# Patient Record
Sex: MALE | Race: WHITE | NOT HISPANIC OR LATINO | Employment: UNEMPLOYED | URBAN - METROPOLITAN AREA
[De-identification: names, ages, dates, MRNs, and addresses within clinical notes are randomized per-mention and may not be internally consistent; named-entity substitution may affect disease eponyms.]

---

## 2017-10-26 ENCOUNTER — ALLSCRIPTS OFFICE VISIT (OUTPATIENT)
Dept: OTHER | Facility: OTHER | Age: 72
End: 2017-10-26

## 2017-10-26 LAB
BILIRUB UR QL STRIP: NORMAL
CLARITY UR: NORMAL
COLOR UR: YELLOW
GLUCOSE (HISTORICAL): NORMAL
HGB UR QL STRIP.AUTO: NORMAL
KETONES UR STRIP-MCNC: NORMAL MG/DL
LEUKOCYTE ESTERASE UR QL STRIP: NORMAL
NITRITE UR QL STRIP: NORMAL
PH UR STRIP.AUTO: 5.5 [PH]
PROT UR STRIP-MCNC: NORMAL MG/DL
SP GR UR STRIP.AUTO: 1.02
UROBILINOGEN UR QL STRIP.AUTO: 0.2

## 2017-11-20 DIAGNOSIS — R97.20 ELEVATED PROSTATE SPECIFIC ANTIGEN (PSA): ICD-10-CM

## 2017-11-20 PROCEDURE — 88344 IMHCHEM/IMCYTCHM EA MLT ANTB: CPT | Performed by: UROLOGY

## 2017-11-20 PROCEDURE — G0416 PROSTATE BIOPSY, ANY MTHD: HCPCS | Performed by: UROLOGY

## 2017-11-21 ENCOUNTER — LAB REQUISITION (OUTPATIENT)
Dept: LAB | Facility: HOSPITAL | Age: 72
End: 2017-11-21
Payer: MEDICARE

## 2017-11-21 DIAGNOSIS — R97.20 ELEVATED PROSTATE SPECIFIC ANTIGEN (PSA): ICD-10-CM

## 2017-11-24 ENCOUNTER — GENERIC CONVERSION - ENCOUNTER (OUTPATIENT)
Dept: OTHER | Facility: OTHER | Age: 72
End: 2017-11-24

## 2017-12-08 ENCOUNTER — GENERIC CONVERSION - ENCOUNTER (OUTPATIENT)
Dept: OTHER | Facility: OTHER | Age: 72
End: 2017-12-08

## 2017-12-19 ENCOUNTER — GENERIC CONVERSION - ENCOUNTER (OUTPATIENT)
Dept: OTHER | Facility: OTHER | Age: 72
End: 2017-12-19

## 2017-12-19 ENCOUNTER — ALLSCRIPTS OFFICE VISIT (OUTPATIENT)
Dept: OTHER | Facility: OTHER | Age: 72
End: 2017-12-19

## 2017-12-20 NOTE — CONSULTS
Assessment  1  History of Cholecystectomy   2  Family history of cardiac disorder (V17 49) (Z82 49) : Father   3  Carcinoma of prostate (185) (C61)    Discussion/Summary  Discussion Summary: This gentleman has a stage II, low but not insignificant volume, intermediate risk prostate cancer  In general terms, his risk level is on the lower end of intermediate  However, the PSA doubling time is a little more than 12 months, which makes this one less optimal for Active Surveillance  He is just 66 with a reasonable longevity expectation  So my recommendation is that this cancer be treated and not observed  He is beyond the usual age for surgery  He was not interested in brachytherapy, or one of the other alternatives  Radiotherapy is an excellent option, and the one I recommend for intermediate risk cancers  He does not need the addition of hormone therapy  The patient is in agreement with this approach  So in consideration of his age, disease and health, I will treat him with IMRT/IGRT covering the prostate gland to a dose of 81 Gy  We will make arrangements for him to receive the gold fiducial marker insertion and schedule a Simulation/RT planning session  We will be able to get him started in January after he returns from his brief trip  Goals and Barriers: The patient has the current Goals: Comply with treatment plan  None  Patient's Capacity to Self-Care: Patient is able to Self-Care  Patient Education: Educational resources provided: Drawings and lengthy instruction about the RT process  Chief Complaint  Chief Complaint Free Text Note Form: Radiation Oncology Consult for prostate cancer  History of Present Illness  HPI: Gaviota Joy is a 66year old male with a new diagnosis of prostate cancer  He has been followed after treatment for a noninvasive bladder cancer in December 2012  He has had a mildly elevated PSA in the 4-5 range over the intervening years, which has been under observation   His PSA then jack to 8 0 in April of this year, and then 10 8 in September  His RANI has been negative for nodule  A biopsy was performed 11/20/17, revealing low grade prostate cancer, Doyle 6, in 5 of the 12 cores, with involvement ranging from 10% to 60% of those cores  There were quite a few cores with HGPIN, two with perineural invasion, and two additional cores with atypia  No further work up was required  He has discussed his options with Dr Damian Obrien, and has come for a radiation oncology consultation  Interval History: AUA 3, Recent event: August 2017 pancreatitisProstate volume 51 gm  Review of Systems  Complete Male ROS [facility] Rad Onc:  Constitutional: The patient denies new or recent history of general fatigue, no recent weight loss, no change in appetite  Karnofsky Performance Scale 90% - Normal, only minor signs/symptoms  Eyes: wears corrective lenses  ENT: sore throat-- and-- hearing loss  Cardiovascular: No complaints of chest pain, no palpitations, no ankle edema  Respiratory: No complaints of shortness of breath, no cough  Gastrointestinal: No complaints of jaundice, no bloody stools, no pale stools  Genitourinary: as noted in HPI  Musculoskeletal: numbness in toes do to back injury  Integumentary: No complaints of rash, no new lesions  Neurological: numbness-- and-- [place] and numbness in toes due to back injury  Hematologic/Lymphatic: No complaints of easy bruising  ROS Reviewed:   ROS reviewed  Active Problems  1  Elevated prostate specific antigen (PSA) (790 93) (R97 20)    Past Medical History  1  History of Acute cystitis without hematuria (595 0) (N30 00)   2  History of Benign localized hyperplasia of prostate with urinary obstruction and lower urinary tract symptoms (600 91,599 69) (N40 1,N13 9)   3  History of Bladder trabeculation (596 89) (N32 89)   4  History of gastroesophageal reflux (GERD) (V12 79) (Z87 19)   5  History of gross hematuria (V13 09) (Z87 448)   6  History of malignant neoplasm of bladder (V10 51) (Z85 51)   7  History of urinary retention (V13 09) (Z87 898)   8  History of Microhematuria (599 72) (R31 29)  Prior Radiation or Chemo:  Prior Radiation: none  Prior Chemotherapy: none   Past Medical History Reviewed: The past medical history was reviewed and updated today  Surgical History  1  History of Back Surgery   2  History of Cholecystectomy   3  History of Cystoscopy (Diagnostic) Bladder   4  History of Cystoscopy With Resection Of Tumor   5  History of Inguinal Hernia Repair   6  History of Knee Surgery   7  History of Laparoscopy Orchiectomy   8  History of Tonsillectomy With Adenoidectomy  Surgical History Reviewed: The surgical history was reviewed and updated today  Family History  1  Family history of lymphoma (V16 7) (Z80 2)   2  Family history of malignant neoplasm (V16 9) (Z80 9)  3  Family history of cardiac disorder (V17 49) (Z82 49)  Family History Reviewed: The family history was reviewed and updated today  Social History   · Does not drink alcohol (V49 89) (Z78 9)   · Does not use smokeless tobacco (V49 89) (Z78 9)   · Former smoker (G05 17) (M20 536)   ·    · No caffeine use   · No illicit drug use  Social History Reviewed: The social history was reviewed and updated today  The social history was reviewed and is unchanged  Current Meds   1  Calcium CAPS; Therapy: (Recorded:26Oct2017) to Recorded   2  Echinacea CAPS; Therapy: (Recorded:26Oct2017) to Recorded   3  Fluticasone Propionate 50 MCG/ACT Nasal Suspension; Therapy: (Recorded:74Gfs3351) to Recorded   4  Multi Vitamin Daily TABS; Therapy: (Recorded:26Oct2017) to Recorded   5  Naproxen 500 MG Oral Tablet; Therapy: (Recorded:26Oct2017) to Recorded   6  Prevacid 30 MG Oral Capsule Delayed Release; Therapy: (Recorded:26Oct2017) to Recorded   7  Vitamin C CAPS; Therapy: (Recorded:26Oct2017) to Recorded    Allergies  1   No Known Drug Allergies    Vitals  Vital Signs    Recorded: 64TVV0536 09:00AM   Heart Rate 80   Systolic 590   Diastolic 88   Height 5 ft 9 in   Weight 220 lb    BMI Calculated 32 49   BSA Calculated 2 15     Physical Exam   Constitutional: General appearance: The Patient is well-developed, well-nourished male who appears his stated age in no acute distress  He is pleasant and talkative  HEENT: Head and face: Normal  -- There is no oral pathology noted  -- The thyroid is normal to inspection and palpation  -- There is no appreciable cervical adenopathy   -- Neck is supple without adenopathy  Chest: The chest is normal to inspection  -- The lungs are clear to auscultation  -- There are bilaterally symmetrical breath sounds  -- There is a RRR, normal S1 and S2, without murmurs, rub or gallop  Genitourinary  Anus and perineum: Normal    Digital rectal exam of prostate: Normal size, no masses  -- mild enlarged  Abdomen: mild obesity  -- There is no evidence of hepatosplenomegaly  Extremities: Gait and station: Normal  -- There is no edema  Neuro: Grossly nonfocal  -- Orientation to person, place and time: Normal  -- Mood and affect: Normal    Lymphatic: no evidence of cervical adenopathy bilaterally  -- no evidence of axillary adenopathy bilaterally  -- no evidence of inguinal adenopathy bilaterally  Results/Data  Diagnostic Studies Reviewed [address] [place] [address] 14 Rad Onc:  CT Scan Review CT scan of Abd and pelvis : 11/27/12 no evidence of calculus  Lab Review 12/23/13 FISH there was no abnormal cells with gains of 2 or more chromosomes identified9/23/15 PSA 5 623/16/16 PSA 4 74/25/17 8 09/15/17 10 8  Pathology Review urine cytology 10/20/16 negative     Op Notes: 12/03/12 TURBT (1) TISSUE EXAM [id number] 01:00PM Majo Hewitt     Test Name Result Flag Reference   LAB AP CASE REPORT (Report)     Surgical Pathology Report             Case: [id number]                  Authorizing Provider: Sarahy Nuñez MD      Collected:      11/20/2017 [address] [place] [address]       Pathologist:      Guero Mendoza MD      Received:      11/21/2017 2942       Specimens:  A) - Prostate, Right lateral base                                  B) - Prostate, Right lateral middle                                 C) - Prostate, Right lateral apex                                  D) - Prostate, Left lateral base                                   E) - Prostate, Left lateral middle                                  F) - Prostate, Left lateral apex                                   G) - Prostate, Left base                                       H) - Prostate, Left middle                                      I) - Prostate, Left apex                                       J) - Prostate, Right base                                      K) - Prostate, Right middle                                     L) - Prostate, Right apex   LAB AP FINAL DIAGNOSIS (Report)     A  Prostate, Right lateral base, needle core biopsy: - Benign prostate tissue; negative for malignancy and high grade prostatic  intraepithelial neoplasia  - Focal acute and chronic inflammation  B  Prostate, Right lateral middle, needle core biopsy: - Prostatic adenocarcinoma, acinar, not otherwise specified; at least  [place]   grade 3+3= score of 6, Prostatic Grade Group I, discontinuously  involving 60% of the   one submitted core  -- Confirmed by prostate triple immunostain (p63 and HMWCK negative;  P504S positive)  - Periprostatic fat invasion: Not identified  - Seminal vesicle invasion: Not identified  - Lymph-vascular invasion: Not identified  - Perineural invasion: Not identified   - Additional Pathologic Findings: None  C  Prostate, Right lateral apex, needle core biopsy: - Prostatic adenocarcinoma, acinar, not otherwise specified; at least  Doyle   grade 3+3= score of 6, Prostatic Grade Group I, involving 15% of the one  submitted core  -- Confirmed by prostate triple immunostain (p63 and HMWCK negative;  P504S positive)    - Periprostatic fat invasion: Not identified  - Seminal vesicle invasion: Not identified  - Lymph-vascular invasion: Not identified  - Perineural invasion: Not identified   - Additional Pathologic Findings: High-grade prostatic intraepithelial  neoplasia (HGPIN)  D  Prostate, Left lateral base, needle core biopsy: - Benign prostate tissue; negative for malignancy and high grade prostatic  intraepithelial neoplasia  E  Prostate, Left lateral middle, needle core biopsy: - Prostatic adenocarcinoma, acinar, not otherwise specified; Doyle grade  3+3= score of 6, Prostatic   Grade Group I, discontinuously involving 25% of the one submitted core  - Periprostatic fat invasion: Not identified  - Seminal vesicle invasion: Not identified  - Lymph-vascular invasion: Not identified  - Perineural invasion: Present  - Additional Pathologic Findings: High-grade prostatic intraepithelial  neoplasia (HGPIN)  F  Prostate, Left lateral apex, needle core biopsy: - Prostatic adenocarcinoma, acinar, not otherwise specified; [place] grade  3+3= score of 6, Prostatic   Grade Group I, involving 10% of the one submitted fragmented core  -- Confirmed by prostate triple immunostain (p63 and HMWCK negative,  P504S positive)  - Periprostatic fat invasion: Not identified  - Seminal vesicle invasion: Not identified  - Lymph-vascular invasion: Not identified  - Perineural invasion: Present  - Additional Pathologic Findings: High-grade prostatic intraepithelial  neoplasia (HGPIN)  G  Prostate, Left base, needle core biopsy: - Benign prostate tissue; negative for malignancy and high grade prostatic  intraepithelial neoplasia  - Focal acute and chronic inflammation  H  Prostate, Left middle, needle core biopsy: - Small focus of atypical glands highly suspicious for adenocarcinoma  -- Confirmed by prostate triple immunostain (p63 and HMWCK negative,  P504S positive)     I  Prostate, Left apex, needle core biopsy: - Prostatic adenocarcinoma, acinar, not otherwise specified; Doyle   grade 3+3= score of 6, Prostatic Grade Group I, discontinuously  involving approximately   25% of the one submitted core  -- Confirmed by prostate triple immunostain (p63 and HMWCK negative,  P504S positive)  - Periprostatic fat invasion: Not identified  - Seminal vesicle invasion: Not identified  - Lymph-vascular invasion: Not identified  - Perineural invasion: Not identified   - Additional Pathologic Findings: Focal chronic inflammation  J  Prostate, Right base, needle core biopsy: - High grade prostatic intraepithelial neoplasia (HGPIN)  - No invasive carcinoma identified  -- Confirmed by prostate triple immunostain [p63, HMWCK and P504S (weak)  positive]  K  Prostate, Right middle, needle core biopsy: - High grade prostatic intraepithelial neoplasia (HGPIN)  - No invasive carcinoma identified  -- Confirmed by prostate triple immunostain [p63, HMWCK and P504S (weak)  positive]  L  Prostate, Right apex, needle core biopsy: - Small focus of atypical glands highly suspicious for adenocarcinoma  -- Confirmed by prostate triple immunostain (p63 and HMWCK negative,  P504S positive)  Interpretation performed at [facility], [address] [place] [address]  Electronically signed by Augusto Chaidez MD on 11/24/2017 at 4:09 PM Preliminary result electronically signed by Augusto Chaidez MD on 11/22/2017 at 10:00 PM   LAB AP NOTE (Report)     Intradepartmental consultation (SS) agrees with the above diagnoses for  specimens B,C, E, F, H and I-L  Debbie Levis  11/24/17   Note: Block E/unstained slides from Block E are suggested if additional  studies are indicated (at least 0 5 mm of tumor for Prolaris)  Preliminary Note (11/22/17): A final report will be issued when the above pending studies are available  for review   A copy of the preliminary report is faxed to Dr Leeroy Wallis on  11/22/17   LAB AP SURGICAL ADDITIONAL INFORMATION (Report)     All controls performed with the immunohistochemical stains reported above  reacted appropriately  These tests were developed and their performance  characteristics determined by Viviane Godinez [facility] or  [employer]  They may not be cleared or approved by the [place]  Food and Drug Administration  The FDA has determined that such clearance  or approval is not necessary  These tests are used for clinical purposes  They should not be regarded as investigational or for research  This  laboratory has been approved by [place] [id number], designated as a high-complexity  laboratory and is qualified to perform these tests  LAB AP GROSS DESCRIPTION (Report)     A  The specimen is received in formalin, labeled with the patient's name  and hospital number, and is designated right lateral base prostate  The  specimen consists of one white tan soft rubbery tissue core having a  diameter of 0 1 cm, and a length of 1 5 cm  Entirely submitted  One  cassette  B  The specimen is received in formalin, labeled with the patient's name  and hospital number, and is designated right lateral mid prostate  The  specimen consists of one white-tan soft rubbery tissue core having a  diameter of 0 1 cm and a length of 1 6 cm  Entirely submitted  One  cassette  C  The specimen is received in formalin, labeled with the patient's name  and hospital number, and is designated right lateral apex prostate  The  specimen consists of one white-tan soft rubbery tissue core having a  diameter of 0 1 cm and a length of 1 5 cm  Entirely submitted  One  cassette  D  The specimen is received in formalin, labeled with the patient's name  and hospital number, and is designated left lateral base prostate  The  specimen consists of one white-tan soft rubbery tissue core having a  diameter of 0 1 cm and a length of 1 6 cm  Entirely submitted  One  cassette    E  The specimen is received in formalin, labeled with the patient's name  and hospital number, and is designated left lateral mid prostate  a the  specimen consists of one white-tan soft rubbery tissue core having a  diameter of 0 17 m and a length of 1 8 cm  Entirely submitted  One  cassette  F  The specimen is received in formalin, labeled with the patient's name  and hospital number, and is designated left lateral apex prostate  The  specimen consists of one white-tan soft rubbery tissue core having a  diameter of 0 1 cm, and having a length of 1 1 cm  Also submitted in  container is one white tan soft tissue fragment measuring 0 2 cm in  greatest dimension  Entirely submitted  One cassette  G  The specimen is received in formalin, labeled with the patient's name  and hospital number, and is designated left base prostate  The specimen  consists of one white-tan soft rubbery tissue core having a diameter of  0 1 cm, and a length of 1 6 cm  Entirely submitted  One cassette  H  The specimen is received in formalin, labeled with the patient's name  and hospital number, and is designated left mid prostate  The specimen  consists of one white-tan soft rubbery tissue core having a diameter of  0 1 cm and a length of 1 3 cm  Entirely submitted  One cassette  I  The specimen is received in formalin, labeled with the patient's name  and hospital number, and is designated left apex prostate  The specimen  consists of one white-tan soft rubbery tissue core having a diameter of  0 1 cm and a length of 1 5 cm  Entirely submitted  One cassette  J  The specimen is received in formalin, labeled with the patient's name  and hospital number, and is designated right base prostate  The specimen  consists of one white-tan soft rubbery tissue core having a diameter of  0 1 cm and a length of 1 1 cm  Entirely submitted  One cassette  K  The specimen is received in formalin, labeled with the patient's name  and hospital number, and is designated right mid prostate   The specimen  consists of one white-tan soft rubbery tissue having a diameter of 0 1 cm  and a length of 1 0 cm  Entirely submitted  One cassette  L  The specimen is received in formalin, labeled with the patient's name  and hospital number, and is designated right apex prostate  The  specimen consists of one white-tan soft rubbery tissue core having a  diameter of 0 1 cm, and having a length of 1 5 cm  Entirely submitted  One  cassette  Note: The estimated total formalin fixation time based upon information  provided by the submitting clinician and the standard processing schedule  is under 72 hours  [place]   LAB AP CLINICAL INFORMATION      PSA 10 8 done 9/15/2017     Urine Dip Automated- POC [id number] 02:43PM Estrella Lucas     Test Name Result Flag Reference   Color Yellow     Clarity Transparent     Leukocytes neg     Nitrite neg     Blood moderate     Bilirubin neg     Urobilinogen 0 2     Protein neg     Ph 5 5     Specific Gravity 1 025     Ketone neg     Glucose neg       Provider Comments  Provider Comments:   long discussion of about 110 minutes, of which at least 50% was in counselling and coordination of care        Future Appointments    Date/Time Provider Specialty Site   11/29/2018 09:00 AM Estrella Lucas MD Urology [address]   12/26/2017 02:00 PM Pat Bridges MD Urology [address] [place] [address] Ave     Signatures   Electronically signed by : Georgie Andrade MD; Dec 19 2017  3:05PM EST                       (Author)    Electronically signed by : Georgie Andrade MD; Dec 19 2017  3:07PM EST                       (Author)    Electronically signed by : Georgie Andrade MD; Dec 19 2017  3:07PM EST                       (Author)

## 2017-12-26 ENCOUNTER — ALLSCRIPTS OFFICE VISIT (OUTPATIENT)
Dept: OTHER | Facility: OTHER | Age: 72
End: 2017-12-26

## 2017-12-26 LAB
BILIRUB UR QL STRIP: NEGATIVE
CLARITY UR: NORMAL
COLOR UR: YELLOW
GLUCOSE (HISTORICAL): NEGATIVE
HGB UR QL STRIP.AUTO: NORMAL
KETONES UR STRIP-MCNC: NEGATIVE MG/DL
LEUKOCYTE ESTERASE UR QL STRIP: NORMAL
NITRITE UR QL STRIP: NEGATIVE
PH UR STRIP.AUTO: 5.5 [PH]
PROT UR STRIP-MCNC: NEGATIVE MG/DL
SP GR UR STRIP.AUTO: >=1.03
UROBILINOGEN UR QL STRIP.AUTO: 0.2

## 2018-01-11 NOTE — MISCELLANEOUS
Message   Recorded as Task   Date: 11/24/2017 03:44 PM, Created By: Shameka Page   Task Name: Miscellaneous   Assigned To: Reji GONZALEZ,TEAM   Regarding Patient: Raya Nicholson, Status: Active   Comment:    Shameka Page - 24 Nov 2017 3:44 PM     TASK CREATED    FYI: Pt had biopsy 11/20/17 Dr Renetta Trammell from pathology calling to make Dr Miracle Ackerman aware there are some significant findings and the report will be ready later today  Alexandrea Carrizales - 24 Nov 2017 3:51 PM     TASK EDITED  WILL FORWARD TO  [facility] [place]        Active Problems    1  Elevated prostate specific antigen (PSA) (790 93) (R97 20)    Current Meds   1  ALPRAZolam 0 5 MG Oral Tablet; TAKE 1 TABLET DAILY; Therapy: [id number] to (05 12 73 93 30); Last Rx:26Oct2017 Ordered   2  Calcium CAPS; Therapy: (Recorded:26Oct2017) to Recorded   3  Ciprofloxacin HCl - 500 MG Oral Tablet; Take 1 tablet twice daily; Therapy: 88ZJR4339 to (05 12 73 93 30)  Requested for: 27Oct2017; Last   Rx:27Oct2017 Ordered   4  Echinacea CAPS; Therapy: (Recorded:26Oct2017) to Recorded   5  Multi Vitamin Daily TABS; Therapy: (Recorded:26Oct2017) to Recorded   6  Naproxen 500 MG Oral Tablet; Therapy: (Recorded:26Oct2017) to Recorded   7  Prevacid 30 MG Oral Capsule Delayed Release (Lansoprazole); Therapy: (Recorded:26Oct2017) to Recorded   8  Sulfamethoxazole-Trimethoprim 800-160 MG Oral Tablet; TAKE 1 TABLET TWICE   DAILY; Therapy: [id number] to (05 12 73 93 30)  Requested for: 27Oct2017; Last   Rx:27Oct2017 Ordered   9  Vitamin C CAPS; Therapy: (Recorded:26Oct2017) to Recorded    Allergies    1   No Known Drug Allergies    Signatures   Electronically signed by : Benjie Martinez, ; Nov 24 2017  3:51PM EST                       (Author)

## 2018-01-14 VITALS
HEIGHT: 69 IN | WEIGHT: 215 LBS | DIASTOLIC BLOOD PRESSURE: 86 MMHG | BODY MASS INDEX: 31.84 KG/M2 | SYSTOLIC BLOOD PRESSURE: 138 MMHG

## 2018-01-22 VITALS
HEART RATE: 80 BPM | SYSTOLIC BLOOD PRESSURE: 160 MMHG | WEIGHT: 220 LBS | HEIGHT: 69 IN | BODY MASS INDEX: 32.58 KG/M2 | DIASTOLIC BLOOD PRESSURE: 88 MMHG

## 2018-01-22 VITALS
WEIGHT: 220 LBS | DIASTOLIC BLOOD PRESSURE: 80 MMHG | BODY MASS INDEX: 32.58 KG/M2 | HEIGHT: 69 IN | SYSTOLIC BLOOD PRESSURE: 154 MMHG

## 2018-01-24 VITALS
DIASTOLIC BLOOD PRESSURE: 80 MMHG | HEIGHT: 69 IN | WEIGHT: 217 LBS | SYSTOLIC BLOOD PRESSURE: 144 MMHG | BODY MASS INDEX: 32.14 KG/M2

## 2018-02-20 DIAGNOSIS — N13.8 BPH WITH OBSTRUCTION/LOWER URINARY TRACT SYMPTOMS: Primary | ICD-10-CM

## 2018-02-20 DIAGNOSIS — N40.1 BPH WITH OBSTRUCTION/LOWER URINARY TRACT SYMPTOMS: Primary | ICD-10-CM

## 2018-02-20 RX ORDER — TAMSULOSIN HYDROCHLORIDE 0.4 MG/1
0.4 CAPSULE ORAL DAILY
Qty: 30 CAPSULE | Refills: 3 | Status: SHIPPED | OUTPATIENT
Start: 2018-02-20 | End: 2018-05-17

## 2018-04-23 ENCOUNTER — TELEPHONE (OUTPATIENT)
Dept: RADIATION ONCOLOGY | Facility: MEDICAL CENTER | Age: 73
End: 2018-04-23

## 2018-04-23 DIAGNOSIS — C61 PROSTATE CA (HCC): Primary | ICD-10-CM

## 2018-05-17 ENCOUNTER — RADIATION ONCOLOGY FOLLOW-UP (OUTPATIENT)
Dept: RADIATION ONCOLOGY | Facility: MEDICAL CENTER | Age: 73
End: 2018-05-17

## 2018-05-17 VITALS
RESPIRATION RATE: 14 BRPM | SYSTOLIC BLOOD PRESSURE: 138 MMHG | BODY MASS INDEX: 32.64 KG/M2 | WEIGHT: 228 LBS | HEART RATE: 74 BPM | HEIGHT: 70 IN | DIASTOLIC BLOOD PRESSURE: 74 MMHG

## 2018-05-17 DIAGNOSIS — C61 MALIGNANT NEOPLASM OF PROSTATE (HCC): Primary | ICD-10-CM

## 2018-05-17 DIAGNOSIS — C61 CARCINOMA OF PROSTATE (HCC): Primary | ICD-10-CM

## 2018-05-17 PROBLEM — Z85.51 HISTORY OF BLADDER CANCER: Status: ACTIVE | Noted: 2018-05-17

## 2018-05-17 RX ORDER — MULTIVIT-MIN/IRON/FOLIC ACID/K 18-600-40
1 CAPSULE ORAL DAILY
COMMUNITY
End: 2019-04-16

## 2018-05-17 RX ORDER — LANSOPRAZOLE 30 MG/1
1 CAPSULE, DELAYED RELEASE ORAL DAILY
COMMUNITY

## 2018-05-17 RX ORDER — FLUTICASONE PROPIONATE 50 MCG
2 SPRAY, SUSPENSION (ML) NASAL DAILY
COMMUNITY

## 2018-05-17 RX ORDER — METHYLDOPA/HYDROCHLOROTHIAZIDE 250MG-25MG
1 TABLET ORAL DAILY
COMMUNITY

## 2018-05-17 RX ORDER — MULTIVITAMIN
1 TABLET ORAL DAILY
COMMUNITY

## 2018-05-17 RX ORDER — NAPROXEN 500 MG/1
1 TABLET ORAL DAILY
COMMUNITY

## 2018-05-17 NOTE — LETTER
Dear Linda Mills DO,    Your patient Tatiana Ulloa , 1945, was seen in consultation for radiation therapy  The pertinent history and plan are below  Please contact me if you have any questions or concerns  Sincerely,  Linda Talley MD       Carcinoma of prostate [facility])    11/20/2017 Initial Diagnosis     Carcinoma of prostate ([facility] [address] ) Stage IIA, T1c, N0, M0, G<=6, P10-20         11/20/2017 Biopsy     Biopsy Pathology    LT Lateral LT Medial RT Medial RT Lateral   Base Benign Benign, focal acute and chronic inflammation  HGPIN   Benign  Focal acute and chronic inflammation  Mid Prostatic adenocarcinoma, Group 1, Discontinuously involving 25% of 1 core, [place] 3+3=6, HGPIN Small focus of atypical glands  HGPIN Prostatic adenocarcinoma, discontinuously involving 60% of 1 core,  Grade I, [place] 3+3=6   [place] Prostatic adenocarcinoma, Group 1, involving 10% of 1 fragmented core, Doyle 3+3=6, PNI, HGPIN Prostatic adenocarcinoma, Group 1, discontinuously involving 25% of 1 core, Doyle 3+3=6, Focal chronic inflammation  Small focus of atypical glands highly suspicious for adenocarcinoma  Prostatic adenocarcinoma, Grade I, Involving 15% of 1 core, [place] 3+3=6, HGPIN                1/24/2018 - 3/28/2018 Radiation     3/28/2018 - completed treatment  Region treated:  Prostate Gland 45/45  C1 Plan ID - RA Pros [id number]) Rx Dose 8,100cGy) Status treatment approved 45/45  Elapsed days - 63 days  Total dose = 81 Gy            Cancer Staging  Carcinoma of prostate [facility])  Staging form: Prostate, AJCC 7th Edition  - Clinical stage from 11/20/2017: Stage IIA (T1c, N0, M0, PSA: 10 to 19, [place] <= 6) - Signed by Linda Talley MD on 5/17/2018    Mr Fernando Sullivan returns to day for his first follow up  He is feeling well  His urinary function is back to normal, and he is now off Tamsulosin  His stream is strong  He has a history of occasional hard stool, and has been using Plum Smart juice on occasion, which helps  His energy level is good  Carcinoma of prostate [facility])  The patient has recovered from the acute effects of RT  He is doing well, and has no complaints  He was advised to continue his Plum Smart Juice routine to keep his stool from getting hard  He is due for twice yearly PSA, and a follow up with us in 6 months  He should also see Dr Omega Nyhan twice yearly for now, and also have his annual cystoscopy for the history of superficial bladder cancer

## 2018-05-17 NOTE — ASSESSMENT & PLAN NOTE
The patient has recovered from the acute effects of RT  He is doing well, and has no complaints  He was advised to continue his Plum Smart Juice routine to keep his stool from getting hard  He is due for twice yearly PSA, and a follow up with us in 6 months  He should also see Dr Lashell Bhandari twice yearly for now, and also have his annual cystoscopy for the history of superficial bladder cancer

## 2018-05-17 NOTE — PROGRESS NOTES
Faith Shark   1945   Mr Steven Horton is a 74 y o  male       Chief Complaint   Patient presents with    Follow-up     Radiation Oncology       Cancer Staging  No matching staging information was found for the patient  Carcinoma of prostate ([facility] [facility] )    11/20/2017 Initial Diagnosis     Carcinoma of prostate ([facility] [address] ) Stage IIA, T1c, N0, M0, G<=6, P10-20         11/20/2017 Biopsy     Biopsy Pathology    LT Lateral LT Medial RT Medial RT Lateral   Base Benign Benign, focal acute and chronic inflammation  HGPIN   Benign  Focal acute and chronic inflammation  Mid Prostatic adenocarcinoma, Group 1, Discontinuously involving 25% of 1 core, [place] 3+3=6, HGPIN Small focus of atypical glands  HGPIN Prostatic adenocarcinoma, discontinuously involving 60% of 1 core,  Grade I, [place] 3+3=6   [place] Prostatic adenocarcinoma, Group 1, involving 10% of 1 fragmented core, [place] 3+3=6, PNI, HGPIN Prostatic adenocarcinoma, Group 1, discontinuously involving 25% of 1 core, [place] 3+3=6, Focal chronic inflammation  Small focus of atypical glands highly suspicious for adenocarcinoma  Prostatic adenocarcinoma, Grade I, Involving 15% of 1 core, [place] 3+3=6, HGPIN                1/24/2018 - 3/28/2018 Radiation     3/28/2018 - completed treatment  Region treated:  Prostate Gland 45/45  C1 Plan ID - RA Pros [id number]) Rx Dose 8,100cGy) Status treatment approved 45/45  Elapsed days - 63 days  Total dose = 81 Gy            Clinical Trial: no    Interval History  73y/o male year old presents first follow up status post radiation treatment for prostatic adenocarcinoma  Treatment completed on 3/38/2018  During treatment, patient had expected acute effects: urinary frequency, occasional weaker stream (AM and afternoon), and occasional burning  Relieved by Tamsulosin and cranberry juice  Reported softer stool and some fatigue  Symptoms are much better, at this time      PSA History:  9/15/17 - Total 10 8 ng/mL  4/25/17 - Total 8 ng/mL  3/16/16 -  Total 4 7 ng/mL  8/23/15 - Total 5 62 ng/mL  (unknown date) 2012 - Total 4 34 ng/mL    Future appt:  11/29/2018: Dr Silvino Hicks, urology for cystoscopy  Screening  Tobacco  Current tobacco user: no  If yes, brief counseling provided: NA    Hypertension  Hypertension screening performed: yes  Normotensive:  no  If no, referred to PCP: yes    Depression Screening  Screened for depression using PHQ-2: yes    Screened for depression using PHQ-9:  no  Screening positive or negative:  negative  If score >4, was any of the following actions taken? Additional evaluation for depression, suicide risk assesment, referral to PCP or psychiatry, medication started:  [id number] [place] [address] for Patients >65 years  Advanced Care Planning Discussed:  yes  Patient named surrogate decision maker or care plan in chart: yes      Health Maintenance   Topic Date Due    COLONOSCOPY  1945    Depression Screening PHQ-9  12/10/1955    DTaP,Tdap,and Td Vaccines (1 - Tdap) 12/10/1964    Fall Risk  12/10/2008    PNEUMOCOCCAL POLYSACCHARIDE VACCINE AGE 72 AND OVER  12/10/2008    GLAUCOMA SCREENING 67+ YR  12/10/2010    INFLUENZA VACCINE  09/01/2018       Patient Active Problem List   Diagnosis    [facility] [facility] )    Carcinoma of prostate ([facility] [address] [facility] )     Past Medical History:   Diagnosis Date    Bladder cancer ([facility] [facility] ) 2012    Pancreatitis      Past Surgical History:   Procedure Laterality Date    BACK SURGERY      BLADDER TUMOR EXCISION      CHOLECYSTECTOMY      COLONOSCOPY      JOINT REPLACEMENT      Bilateral knee    SKIN BIOPSY      Forehead tumor removal    SURGERY SCROTAL / TESTICULAR      Left testicle removed    TONSILLECTOMY       Family History   Problem Relation Age of Onset    Lymphoma Mother      Social History     Social History    Marital status: /Civil Union     Spouse name: N/A    Number of children: N/A    Years of education: N/A     Occupational History    Not on file  Social History Main Topics    Smoking status: Former Smoker     Packs/day: 1 00     Years: 10 00     Types: Cigarettes     Start date: 5/17/1956     Quit date: 5/17/1966    Smokeless tobacco: Never Used    Alcohol use No    Drug use: No    Sexual activity: Not on file     Other Topics Concern    Not on file     Social History Narrative    No narrative on file       Current Outpatient Prescriptions:     Ascorbic Acid (VITAMIN C) 500 MG CAPS, Take 1 capsule by mouth daily, Disp: , Rfl:     Calcium 250 MG CAPS, Take 1 capsule by mouth daily, Disp: , Rfl:     Echinacea 125 MG CAPS, Take 1 capsule by mouth daily, Disp: , Rfl:     fluticasone (FLONASE) 50 mcg/act nasal spray, 2 sprays into each nostril daily, Disp: , Rfl:     lansoprazole (PREVACID) 30 mg capsule, Take 1 capsule by mouth daily, Disp: , Rfl:     Multiple Vitamin (MULTI-VITAMIN DAILY) TABS, Take 1 tablet by mouth daily, Disp: , Rfl:     naproxen (NAPROSYN) 500 mg tablet, Take 1 tablet by mouth daily As needed for pain , Disp: , Rfl:   No Known Allergies    Review of Systems:  Review of Systems   Constitutional: Negative  HENT: Negative  Eyes: Negative  Respiratory: Negative  Cardiovascular: Negative  Gastrointestinal: Negative  Endocrine: Negative  Genitourinary: Positive for frequency (Less than 1 time in 5 )  Incomplete emptying 2/5  Weak stream improved at 1/5  Musculoskeletal: Negative  Skin: Negative  Allergic/Immunologic: Negative  Neurological: Negative  Hematological: Negative  Psychiatric/Behavioral: Negative  Vitals:    05/17/18 0811   BP: 138/74   BP Location: Right arm   Patient Position: Sitting   Cuff Size: Large   Pulse: 74   Resp: 14   Weight: 103 kg (228 lb)   Height: 5' 10" (1 778 m)       Pain Score: 0-No pain    Imaging:No results found

## 2018-05-17 NOTE — PROGRESS NOTES
Consultation - Radiation Oncology  [facility] CONTINUECARE AT [facility] RADIATION ONC    Merrilyn Essex , 1945, [id number]           Merrilyn Essex  is a 74 y o  gentleman with a history of Cancer Staging  Carcinoma of prostate [facility])  Staging form: Prostate, AJCC 7th Edition  - Clinical stage from 11/20/2017: Stage IIA (T1c, N0, M0, PSA: 10 to 19, [place] <= 6) - Signed by Gail Rust MD on 5/17/2018      Treatment:external beam radiation  He presents today for follow-up  Mr Dave Kraus returns to day for his first follow up  He is feeling well  His urinary function is back to normal, and he is now off Tamsulosin  His stream is strong  He has a history of occasional hard stool, and has been using Plum Smart juice on occasion, which helps  His energy level is good  Carcinoma of prostate ([facility] [address] )    11/20/2017 Initial Diagnosis     Carcinoma of prostate ([facility] [address] ) Stage IIA, T1c, N0, M0, G<=6, P10-20         11/20/2017 Biopsy     Biopsy Pathology    LT Lateral LT Medial RT Medial RT Lateral   Base Benign Benign, focal acute and chronic inflammation  HGPIN   Benign  Focal acute and chronic inflammation  Mid Prostatic adenocarcinoma, Group 1, Discontinuously involving 25% of 1 core, [place] 3+3=6, HGPIN Small focus of atypical glands  HGPIN Prostatic adenocarcinoma, discontinuously involving 60% of 1 core,  Grade I, [place] 3+3=6   [place] Prostatic adenocarcinoma, Group 1, involving 10% of 1 fragmented core, [place] 3+3=6, PNI, HGPIN Prostatic adenocarcinoma, Group 1, discontinuously involving 25% of 1 core, [place] 3+3=6, Focal chronic inflammation  Small focus of atypical glands highly suspicious for adenocarcinoma  Prostatic adenocarcinoma, Grade I, Involving 15% of 1 core, [place] 3+3=6, HGPIN                1/24/2018 - 3/28/2018 Radiation     3/28/2018 - completed treatment  Region treated:  Prostate Gland 45/45  C1 Plan ID - RA Pros [id number]) Rx Dose 8,100cGy) Status treatment approved 45/45    Elapsed days - 63 days  Total dose = 81 Gy            Current  symptoms: incomplete voiding of a very mild degree  Erections: yes  Current GI symptoms: Gastrointestinal ROS: no abdominal pain, change in bowel habits, or black or bloody stools      Additional ROS: none    Pain Management Panel     There is no flowsheet data to display  none     Level:   Site:    Character:   Plan:      Patient Active Problem List   Diagnosis    Prostate CA ([facility] )    Carcinoma of prostate ([facility] )    History of bladder cancer     Carcinoma of prostate ([facility] )  The patient has recovered from the acute effects of RT  He is doing well, and has no complaints  He is due for twice yearly PSA, and a follow up with us in 6 months  He should also see Dr Mohini Hayden twice yearly for now, and also have his annual cystoscopy for the history of superficial bladder cancer  Current Outpatient Prescriptions   Medication Sig Dispense Refill    Ascorbic Acid (VITAMIN C) 500 MG CAPS Take 1 capsule by mouth daily      Calcium 250 MG CAPS Take 1 capsule by mouth daily      Echinacea 125 MG CAPS Take 1 capsule by mouth daily      fluticasone (FLONASE) 50 mcg/act nasal spray 2 sprays into each nostril daily      lansoprazole (PREVACID) 30 mg capsule Take 1 capsule by mouth daily      Multiple Vitamin (MULTI-VITAMIN DAILY) TABS Take 1 tablet by mouth daily      naproxen (NAPROSYN) 500 mg tablet Take 1 tablet by mouth daily As needed for pain  No current facility-administered medications for this visit  Vitals:    05/17/18 0811   BP: 138/74   Pulse: 74   Resp: 14     Wt Readings from Last 3 Encounters:   05/17/18 103 kg (228 lb)   12/26/17 99 8 kg (220 lb)   12/19/17 99 8 kg (220 lb)       Prostate exam: not performed at this interval after RT>  Other PE:  The patient looks well today  No results found for: PSA    Assessment/Plan   1   Carcinoma of prostate ([facility] )      Carcinoma of prostate [facility])  The patient has recovered from the acute effects of RT  He is doing well, and has no complaints  He is due for twice yearly PSA, and a follow up with us in 6 months  He should also see Dr Carmelo Hartman twice yearly for now, and also have his annual cystoscopy for the history of superficial bladder cancer  No orders of the defined types were placed in this encounter  Continue present management  Follow-up as directed in patient instructions  Time spent on encounter:  25    minutes with patient, over 50% of which was counseling and coordination of care

## 2018-11-21 ENCOUNTER — RADIATION ONCOLOGY FOLLOW-UP (OUTPATIENT)
Dept: RADIATION ONCOLOGY | Facility: MEDICAL CENTER | Age: 73
End: 2018-11-21
Payer: MEDICARE

## 2018-11-21 VITALS
DIASTOLIC BLOOD PRESSURE: 64 MMHG | RESPIRATION RATE: 16 BRPM | TEMPERATURE: 96.7 F | BODY MASS INDEX: 27.84 KG/M2 | WEIGHT: 194 LBS | SYSTOLIC BLOOD PRESSURE: 116 MMHG | HEART RATE: 66 BPM

## 2018-11-21 DIAGNOSIS — C61 PROSTATE CA (HCC): Primary | ICD-10-CM

## 2018-11-21 PROCEDURE — 99214 OFFICE O/P EST MOD 30 MIN: CPT | Performed by: RADIOLOGY

## 2018-11-21 NOTE — PROGRESS NOTES
Allison Carmona   1945   Mr Suzy Olvera is a 74 y o  male       Chief Complaint   Patient presents with    Follow-up     Prostate Cancer   Cancer Staging  Carcinoma of prostate [facility])  Staging form: Prostate, AJCC 7th Edition  - Clinical stage from 11/20/2017: Stage IIA (T1c, N0, M0, PSA: 10 to 19, [place] <= 6) - Signed by Franko Dinh MD on 5/17/2018         [facility] ([facility] )    11/20/2017 Initial Diagnosis     Carcinoma of prostate ([facility] ) Stage IIA, T1c, N0, M0, G<=6, P10-20         11/20/2017 Biopsy     Biopsy Pathology    LT Lateral LT Medial RT Medial RT Lateral   Base Benign Benign, focal acute and chronic inflammation  HGPIN   Benign  Focal acute and chronic inflammation  Mid Prostatic adenocarcinoma, Group 1, Discontinuously involving 25% of 1 core, [place] 3+3=6, HGPIN Small focus of atypical glands  HGPIN Prostatic adenocarcinoma, discontinuously involving 60% of 1 core,  Grade I, [place] 3+3=6   [place] Prostatic adenocarcinoma, Group 1, involving 10% of 1 fragmented core, [place] 3+3=6, PNI, HGPIN Prostatic adenocarcinoma, Group 1, discontinuously involving 25% of 1 core, [place] 3+3=6, Focal chronic inflammation  Small focus of atypical glands highly suspicious for adenocarcinoma  Prostatic adenocarcinoma, Grade I, Involving 15% of 1 core, [place] 3+3=6, HGPIN                1/24/2018 - 3/28/2018 Radiation     3/28/2018 - completed treatment  Region treated:  Prostate Gland 45/45  C1 Plan ID - RA Pros [id number]) Rx Dose 8,100cGy) Status treatment approved 45/45  Elapsed days - 63 days  Total dose = 81 Gy            Clinical Trial: no    Interval History  Last seen 5/17/18 by Dr Gail Joshi:   The patient has recovered from the acute effects of RT  He is doing well, and has no complaints  He is due for twice yearly PSA, and a follow up with us in 6 months    He should also see Dr Norris Mark twice yearly for now, and also have his annual cystoscopy for the history of superficial bladder cancer  8/10/18 PSA: 2 7    11/29/18 F/U with Dr Nicho Thomas    Screening  Tobacco  Current tobacco user: no  If yes, brief counseling provided: No    Hypertension  Hypertension screening performed: yes  Normotensive:  yes  If no, referred to PCP: no    Depression Screening  Screened for depression using PHQ-2: yes    Screened for depression using PHQ-9:  no  Screening positive or negative:  negative  If score >4, was any of the following actions taken? Additional evaluation for depression, suicide risk assesment, referral to PCP or psychiatry, medication started:  no    Advanced Care Planning for Patients >65 years  Advanced Care Planning Discussed:  yes  Patient named surrogate decision maker or care plan in chart: yes    Health Maintenance   Topic Date Due    CRC Screening: Colonoscopy  1945    DTaP,Tdap,and Td Vaccines (1 - Tdap) 12/10/1964    Fall Risk  12/10/2008    Pneumococcal PPSV23/PCV13 65+ Years / High and Highest Risk (1 of 2 - PCV13) 12/10/2008    INFLUENZA VACCINE  07/01/2018    Depression Screening PHQ  05/17/2019       Patient Active Problem List   Diagnosis    Pro[facility] CA ([facility] [address] )    Carcinoma of prostate ([facility] [address] )    History of bladder cancer     Past Medical History:   Diagnosis Date    Bladder cancer ([facility] [address] ) 2012    Pancreatitis      Past Surgical History:   Procedure Laterality Date    BACK SURGERY      BLADDER TUMOR EXCISION      CHOLECYSTECTOMY      COLONOSCOPY      JOINT REPLACEMENT      Bilateral knee    SKIN BIOPSY      Forehead tumor removal    SURGERY SCROTAL / TESTICULAR      Left testicle removed    TONSILLECTOMY       Family History   Problem Relation Age of Onset    Lymphoma Mother      Social History     Social History    Marital status: /Civil Union     Spouse name: N/A    Number of children: 8    Years of education: N/A     Occupational History    Not on file       Social History Main Topics    Smoking status: Former Smoker     Packs/day: 1 00 Years: 10 00     Types: Cigarettes     Start date: 5/17/1956     Quit date: 5/17/1966    Smokeless tobacco: Never Used    Alcohol use No    Drug use: No    Sexual activity: Not on file     Other Topics Concern    Not on file     Social History Narrative    No narrative on file       Current Outpatient Prescriptions:     Ascorbic Acid (VITAMIN C) 500 MG CAPS, Take 1 capsule by mouth daily, Disp: , Rfl:     Echinacea 125 MG CAPS, Take 1 capsule by mouth daily, Disp: , Rfl:     fluticasone (FLONASE) 50 mcg/act nasal spray, 2 sprays into each nostril daily, Disp: , Rfl:     lansoprazole (PREVACID) 30 mg capsule, Take 1 capsule by mouth daily, Disp: , Rfl:     Multiple Vitamin (MULTI-VITAMIN DAILY) TABS, Take 1 tablet by mouth daily, Disp: , Rfl:     naproxen (NAPROSYN) 500 mg tablet, Take 1 tablet by mouth daily As needed for pain , Disp: , Rfl:   No Known Allergies    Review of Systems:  Review of Systems   Constitutional: Negative  HENT: Positive for hearing loss (Bilateral)  Eyes: Negative  Respiratory: Negative  Cardiovascular: Negative  Gastrointestinal: Negative  Endocrine: Negative  Genitourinary: Negative for dysuria and urgency  Weak urine stream      Musculoskeletal: Positive for arthralgias and back pain  Skin: Negative  Allergic/Immunologic: Negative  Neurological: Negative  Hematological: Negative  Psychiatric/Behavioral: Negative  Vitals:    11/21/18 0832   BP: 116/64   BP Location: Right arm   Patient Position: Sitting   Cuff Size: Standard   Pulse: 66   Resp: 16   Temp: (!) 96 7 °F (35 9 °C)   TempSrc: Oral   Weight: 88 kg (194 lb)       Pain Score: 0-No pain    Imaging:No results found

## 2018-11-21 NOTE — PROGRESS NOTES
Follow-up - Radiation Oncology   Mike Leah  1945 74 y o  male [id number]      History of Present Illness   Cancer Staging  Carcinoma of prostate [facility])  Staging form: Prostate, AJCC 7th Edition  - Clinical stage from 11/20/2017: Stage IIA (T1c, N0, M0, PSA: 10 to 19, [place] <= 6) - Signed by Joseph Ceballos MD on 5/17/2018            Interval History  Last seen 5/17/18 by Dr Prabha Johnson:   The patient has recovered from the acute effects of RT  Stacie Huge is doing well, and has no complaints    He is due for twice yearly PSA, and a follow up with us in 6 months  He should also see Dr Silvia Walters twice yearly for now, and also have his annual cystoscopy for the history of superficial bladder cancer      8/10/18 PSA: 2 7     11/29/18 F/U with Dr Silvia Walters      He states his stream has been slower in the last 4 weeks  He was seen by his family doctor who renewed his Flomax  He denies any dysuria, nocturia  No symptoms of proctitis      Screening  Tobacco  Current tobacco user: no  If yes, brief counseling provided: No     Hypertension  Hypertension screening performed: yes  Normotensive:  yes  If no, referred to PCP: no     Depression Screening  Screened for depression using PHQ-2: yes     Screened for depression using PHQ-9:  no  Screening positive or negative:  negative  If score >4, was any of the following actions taken?    Additional evaluation for depression, suicide risk assesment, referral to PCP or psychiatry, medication started:  no     Advanced Care Planning for Patients >65 years  Advanced Care Planning Discussed:  yes  Patient named surrogate decision maker or care plan in chart: yes          Health Maintenance                 Historical Information      Carcinoma of prostate ([facility] [address] )    11/20/2017 Initial Diagnosis     Carcinoma of prostate ([facility] [address] ) Stage IIA, T1c, N0, M0, G<=6, P10-20         11/20/2017 Biopsy     Biopsy Pathology    LT Lateral LT Medial RT Medial RT Lateral   Base Benign Benign, focal acute and chronic inflammation  HGPIN   Benign  Focal acute and chronic inflammation  Mid Prostatic adenocarcinoma, Group 1, Discontinuously involving 25% of 1 core, [place] 3+3=6, HGPIN Small focus of atypical glands  HGPIN Prostatic adenocarcinoma, discontinuously involving 60% of 1 core,  Grade I, [place] 3+3=6   [place] Prostatic adenocarcinoma, Group 1, involving 10% of 1 fragmented core, [place] 3+3=6, PNI, HGPIN Prostatic adenocarcinoma, Group 1, discontinuously involving 25% of 1 core, [place] 3+3=6, Focal chronic inflammation  Small focus of atypical glands highly suspicious for adenocarcinoma  Prostatic adenocarcinoma, Grade I, Involving 15% of 1 core, [place] 3+3=6, HGPIN                1/24/2018 - 3/28/2018 Radiation     3/28/2018 - completed treatment  Region treated:  Prostate Gland 45/45  C1 Plan ID - RA Pros [id number]) Rx Dose 8,100cGy) Status treatment approved 45/45    Elapsed days - 63 days  Total dose = 81 Gy            Past Medical History:   Diagnosis Date    Bladder cancer ([facility] [address] ) 2012    Pancreatitis      Past Surgical History:   Procedure Laterality Date    BACK SURGERY      BLADDER TUMOR EXCISION      CHOLECYSTECTOMY      COLONOSCOPY      JOINT REPLACEMENT      Bilateral knee    SKIN BIOPSY      Forehead tumor removal    SURGERY SCROTAL / TESTICULAR      Left testicle removed    TONSILLECTOMY         Social History   History   Alcohol Use No     History   Drug Use No     History   Smoking Status    Former Smoker    Packs/day: 1 00    Years: 10 00    Types: Cigarettes    Start date: 5/17/1956   Bridgette Melissa Quit date: 5/17/1966   Smokeless Tobacco    Never Used         Meds/Allergies     Current Outpatient Prescriptions:     Ascorbic Acid (VITAMIN C) 500 MG CAPS, Take 1 capsule by mouth daily, Disp: , Rfl:     Echinacea 125 MG CAPS, Take 1 capsule by mouth daily, Disp: , Rfl:     fluticasone (FLONASE) 50 mcg/act nasal spray, 2 sprays into each nostril daily, Disp: , Rfl:     lansoprazole (PREVACID) 30 mg capsule, Take 1 capsule by mouth daily, Disp: , Rfl:     Multiple Vitamin (MULTI-VITAMIN DAILY) TABS, Take 1 tablet by mouth daily, Disp: , Rfl:     naproxen (NAPROSYN) 500 mg tablet, Take 1 tablet by mouth daily As needed for pain , Disp: , Rfl:   No Known Allergies      Review of Systems  Constitutional: Negative  HENT: Positive for hearing loss (Bilateral)  Eyes: Negative  Respiratory: Negative  Cardiovascular: Negative  Gastrointestinal: Negative  Endocrine: Negative  Genitourinary: Negative for dysuria and urgency  Weak urine stream      Musculoskeletal: Positive for arthralgias and back pain  Skin: Negative  Allergic/Immunologic: Negative  Neurological: Negative  Hematological: Negative  Psychiatric/Behavioral: Negative            OBJECTIVE:   /64 (BP Location: Right arm, Patient Position: Sitting, Cuff Size: Standard)   Pulse 66   Temp (!) 96 7 °F (35 9 °C) (Oral)   Resp 16   Wt 88 kg (194 lb)   BMI 27 84 kg/m²   Pain Assessment:  0  {MDA IP Performance Status Choice:0    Physical Exam   Constitutional: He appears well-developed  HENT:   Head: Normocephalic  Mouth/Throat: Oropharynx is clear and moist    Eyes: Pupils are equal, round, and reactive to light  EOM are normal    Neck: Normal range of motion  Neck supple  Cardiovascular: Normal rate, regular rhythm and normal heart sounds  Pulmonary/Chest: Effort normal and breath sounds normal  He has no wheezes  He exhibits no tenderness  Abdominal: Soft  Bowel sounds are normal  He exhibits no distension and no mass  There is no tenderness  Genitourinary: Rectum normal and prostate normal    Musculoskeletal: Normal range of motion  He exhibits no edema  Lymphadenopathy:     He has no cervical adenopathy  Neurological: He is alert  No cranial nerve deficit  Coordination normal    Skin: Skin is warm  No rash noted  No erythema  Psychiatric: He has a normal mood and affect   His behavior is normal            RESULTS    Lab Results: No results found for this or any previous visit (from the past 672 hour(s))  Imaging Studies:No results found  Assessment/Plan:  No orders of the defined types were placed in this encounter  Dg Kiran  is a 74y o  year old male who is 7 months post radiation therapy for prostate carcinoma  His PSA from 8/10/2018 returned 2 7  Pretreatment PSA 10 8  He has no significant post radiation sequelae  He will be following with Dr Coty Garcia as he also has a history of superficial bladder cancer  He will return for follow-up visit in 6 months  Guero Thurman MD  11/21/2018,9:02 AM    Portions of the record may have been created with voice recognition software   Occasional wrong word or "sound a like" substitutions may have occurred due to the inherent limitations of voice recognition software   Read the chart carefully and recognize, using context, where substitutions have occurred

## 2018-11-28 RX ORDER — ACETAMINOPHEN 500 MG
500 TABLET ORAL EVERY 6 HOURS PRN
COMMUNITY
Start: 2016-12-20

## 2018-11-28 RX ORDER — METHOCARBAMOL 750 MG/1
750 TABLET, FILM COATED ORAL 4 TIMES DAILY PRN
COMMUNITY
Start: 2017-08-17 | End: 2019-04-16

## 2018-11-28 RX ORDER — TAMSULOSIN HYDROCHLORIDE 0.4 MG/1
CAPSULE ORAL
COMMUNITY
Start: 2018-11-14 | End: 2019-04-16

## 2018-11-28 RX ORDER — SILDENAFIL CITRATE 50 MG
TABLET ORAL
COMMUNITY
Start: 2018-11-14

## 2018-11-29 ENCOUNTER — PROCEDURE VISIT (OUTPATIENT)
Dept: UROLOGY | Facility: MEDICAL CENTER | Age: 73
End: 2018-11-29
Payer: MEDICARE

## 2018-11-29 VITALS
BODY MASS INDEX: 27.4 KG/M2 | WEIGHT: 185 LBS | DIASTOLIC BLOOD PRESSURE: 62 MMHG | HEART RATE: 61 BPM | SYSTOLIC BLOOD PRESSURE: 116 MMHG | HEIGHT: 69 IN

## 2018-11-29 DIAGNOSIS — Z85.46 HISTORY OF PROSTATE CANCER: ICD-10-CM

## 2018-11-29 DIAGNOSIS — Z85.51 HISTORY OF BLADDER CANCER: Primary | ICD-10-CM

## 2018-11-29 PROCEDURE — 52000 CYSTOURETHROSCOPY: CPT | Performed by: UROLOGY

## 2018-11-29 PROCEDURE — 99213 OFFICE O/P EST LOW 20 MIN: CPT | Performed by: UROLOGY

## 2018-11-29 RX ORDER — ALPRAZOLAM 1 MG/1
TABLET ORAL
Qty: 1 TABLET | Refills: 0 | Status: SHIPPED | OUTPATIENT
Start: 2018-11-29 | End: 2019-05-30 | Stop reason: ALTCHOICE

## 2018-11-29 NOTE — PROGRESS NOTES
Assessment/Plan:  1  No recurrence of bladder cancer    2  Check PSA in February, that will be six months after the August PSA    3  Cysto in one year, oral Xanax provided to take before hand  No problem-specific Assessment & Plan notes found for this encounter  Diagnoses and all orders for this visit:    History of bladder cancer  -     ALPRAZolam (XANAX) 1 mg tablet; 1-2 hr before procedure    History of prostate cancer  -     PSA Total, Diagnostic; Future    Other orders  -     acetaminophen (TYLENOL) 500 mg tablet; Take 500 mg by mouth every 6 (six) hours as needed  -     methocarbamol (ROBAXIN) 750 mg tablet; Take 750 mg by mouth 4 (four) times a day as needed  -     VIAGRA 50 MG tablet;   -     tamsulosin (FLOMAX) 0 4 mg;   -     Cystoscopy          Subjective:      Patient ID: Myla Carroll  is a 74 y o  male  Follow-up for two problems:      1  Finished XRT June 2018 for [place] six prostate cancer in 5 of the 12 cores, with involvement ranging from 10% to 60% of those cores  PSA 2 2 in August    2  History of bladder cancer, tumor over six years ago, cysto was negative to date  The following portions of the patient's history were reviewed and updated as appropriate: allergies, current medications, past family history, past medical history, past social history, past surgical history and problem list     Review of Systems   All other systems reviewed and are negative  Objective: There were no vitals taken for this visit  Physical Exam   Constitutional: He is oriented to person, place, and time  He appears well-developed and well-nourished  No distress  HENT:   Head: Normocephalic and atraumatic  Eyes: Conjunctivae are normal    Cardiovascular: Normal rate and regular rhythm  Pulmonary/Chest: Effort normal and breath sounds normal  No respiratory distress  He has no wheezes  Abdominal: Soft  Bowel sounds are normal  He exhibits no distension and no mass  There is no tenderness  Genitourinary:   Genitourinary Comments: Penis and testes normal   Neurological: He is alert and oriented to person, place, and time  Skin: Skin is warm and dry  He is not diaphoretic  Cystoscopy  Date/Time: 11/29/2018 10:18 AM  Performed by: Tomi Mares  Authorized by: Tomi Mares     Procedure details: cystoscopy    Additional Procedure Details: The patient was carefully  positioned supine on the examining table  Sterile preparation was performed on the urethra  Xylocaine jelly was instilled and left  Indwelling for the procedure  The 13 [place] flexible cystoscope was passed with the following findings:      Urethra:   No strictures    Prostate:  Mild lateral lobe hyperplasia, minimal obstruction    Bladder:   Mild trabeculations, no tumors stones lesions     Residual urine:  Minimal    Patient tolerated the procedure only fairly well, no IV sedation this time,   and was escorted from the examining table

## 2018-11-29 NOTE — LETTER
November 29, 2018     Ramila Castillo, [address] [address]    Patient: Tegan Beltrán  YOB: 1945   Date of Visit: 11/29/2018       Dear Dr Blaine Ramires: Thank you for referring Pradip Osborne to me for evaluation  Below are my notes for this consultation  If you have questions, please do not hesitate to call me  I look forward to following your patient along with you  Sincerely,        Alma Andrew MD        CC: No Recipients  Alma Andrew MD  11/29/2018 10:19 AM  Sign at close encounter  Assessment/Plan:  1  No recurrence of bladder cancer    2  Check PSA in February, that will be six months after the August PSA    3  Cysto in one year, oral Xanax provided to take before hand  No problem-specific Assessment & Plan notes found for this encounter  Diagnoses and all orders for this visit:    History of bladder cancer  -     ALPRAZolam (XANAX) 1 mg tablet; 1-2 hr before procedure    History of prostate cancer  -     PSA Total, Diagnostic; Future    Other orders  -     acetaminophen (TYLENOL) 500 mg tablet; Take 500 mg by mouth every 6 (six) hours as needed  -     methocarbamol (ROBAXIN) 750 mg tablet; Take 750 mg by mouth 4 (four) times a day as needed  -     VIAGRA 50 MG tablet;   -     tamsulosin (FLOMAX) 0 4 mg;   -     Cystoscopy          Subjective:      Patient ID: Tegan Beltrán  is a 74 y o  male  Follow-up for two problems:      1  Finished XRT June 2018 for [place] six prostate cancer in 5 of the 12 cores, with involvement ranging from 10% to 60% of those cores  PSA 2 2 in August    2  History of bladder cancer, tumor over six years ago, cysto was negative to date          The following portions of the patient's history were reviewed and updated as appropriate: allergies, current medications, past family history, past medical history, past social history, past surgical history and problem list     Review of Systems   All other systems reviewed and are negative  Objective: There were no vitals taken for this visit  Physical Exam   Constitutional: He is oriented to person, place, and time  He appears well-developed and well-nourished  No distress  HENT:   Head: Normocephalic and atraumatic  Eyes: Conjunctivae are normal    Cardiovascular: Normal rate and regular rhythm  Pulmonary/Chest: Effort normal and breath sounds normal  No respiratory distress  He has no wheezes  Abdominal: Soft  Bowel sounds are normal  He exhibits no distension and no mass  There is no tenderness  Genitourinary:   Genitourinary Comments: Penis and testes normal   Neurological: He is alert and oriented to person, place, and time  Skin: Skin is warm and dry  He is not diaphoretic  Cystoscopy  Date/Time: 11/29/2018 10:18 AM  Performed by: Buffy Dykes  Authorized by: Buffy Dykes     Procedure details: cystoscopy    Additional Procedure Details: The patient was carefully  positioned supine on the examining table  Sterile preparation was performed on the urethra  Xylocaine jelly was instilled and left  Indwelling for the procedure  The 13 [place] flexible cystoscope was passed with the following findings:      Urethra:   No strictures    Prostate:  Mild lateral lobe hyperplasia, minimal obstruction    Bladder:   Mild trabeculations, no tumors stones lesions     Residual urine:  Minimal    Patient tolerated the procedure only fairly well, no IV sedation this time,   and was escorted from the examining table

## 2019-04-16 ENCOUNTER — CLINICAL SUPPORT (OUTPATIENT)
Dept: RADIATION ONCOLOGY | Facility: CLINIC | Age: 74
End: 2019-04-16
Attending: RADIOLOGY
Payer: MEDICARE

## 2019-04-16 VITALS
OXYGEN SATURATION: 96 % | SYSTOLIC BLOOD PRESSURE: 128 MMHG | TEMPERATURE: 97.6 F | RESPIRATION RATE: 16 BRPM | HEIGHT: 69 IN | BODY MASS INDEX: 30.27 KG/M2 | WEIGHT: 204.4 LBS | HEART RATE: 63 BPM | DIASTOLIC BLOOD PRESSURE: 60 MMHG

## 2019-04-16 DIAGNOSIS — C61 PROSTATE CANCER (HCC): Primary | ICD-10-CM

## 2019-04-16 DIAGNOSIS — C61 CARCINOMA OF PROSTATE (HCC): Primary | ICD-10-CM

## 2019-04-16 PROCEDURE — 99214 OFFICE O/P EST MOD 30 MIN: CPT | Performed by: RADIOLOGY

## 2019-05-30 ENCOUNTER — OFFICE VISIT (OUTPATIENT)
Dept: UROLOGY | Facility: MEDICAL CENTER | Age: 74
End: 2019-05-30
Payer: MEDICARE

## 2019-05-30 VITALS
HEIGHT: 69 IN | SYSTOLIC BLOOD PRESSURE: 118 MMHG | DIASTOLIC BLOOD PRESSURE: 60 MMHG | BODY MASS INDEX: 28.14 KG/M2 | HEART RATE: 62 BPM | WEIGHT: 190 LBS

## 2019-05-30 DIAGNOSIS — Z85.46 HISTORY OF PROSTATE CANCER: ICD-10-CM

## 2019-05-30 DIAGNOSIS — Z85.51 HISTORY OF BLADDER CANCER: Primary | ICD-10-CM

## 2019-05-30 LAB
SL AMB  POCT GLUCOSE, UA: ABNORMAL
SL AMB LEUKOCYTE ESTERASE,UA: ABNORMAL
SL AMB POCT BILIRUBIN,UA: ABNORMAL
SL AMB POCT BLOOD,UA: ABNORMAL
SL AMB POCT CLARITY,UA: CLEAR
SL AMB POCT COLOR,UA: YELLOW
SL AMB POCT KETONES,UA: ABNORMAL
SL AMB POCT NITRITE,UA: ABNORMAL
SL AMB POCT PH,UA: 5.5
SL AMB POCT SPECIFIC GRAVITY,UA: 1.02
SL AMB POCT URINE PROTEIN: ABNORMAL
SL AMB POCT UROBILINOGEN: 0.2

## 2019-05-30 PROCEDURE — 99214 OFFICE O/P EST MOD 30 MIN: CPT | Performed by: UROLOGY

## 2019-05-30 PROCEDURE — 81003 URINALYSIS AUTO W/O SCOPE: CPT | Performed by: UROLOGY

## 2019-11-26 LAB — PSA SERPL-MCNC: 0.4 NG/ML

## 2019-12-18 ENCOUNTER — PROCEDURE VISIT (OUTPATIENT)
Dept: UROLOGY | Facility: MEDICAL CENTER | Age: 74
End: 2019-12-18
Payer: MEDICARE

## 2019-12-18 VITALS
SYSTOLIC BLOOD PRESSURE: 144 MMHG | HEART RATE: 76 BPM | BODY MASS INDEX: 29.62 KG/M2 | DIASTOLIC BLOOD PRESSURE: 82 MMHG | WEIGHT: 200 LBS | HEIGHT: 69 IN

## 2019-12-18 DIAGNOSIS — Z85.51 HISTORY OF BLADDER CANCER: Primary | ICD-10-CM

## 2019-12-18 DIAGNOSIS — Z85.46 HISTORY OF PROSTATE CANCER: ICD-10-CM

## 2019-12-18 LAB
SL AMB  POCT GLUCOSE, UA: NEGATIVE
SL AMB LEUKOCYTE ESTERASE,UA: NEGATIVE
SL AMB POCT BILIRUBIN,UA: NEGATIVE
SL AMB POCT BLOOD,UA: ABNORMAL
SL AMB POCT CLARITY,UA: CLEAR
SL AMB POCT COLOR,UA: YELLOW
SL AMB POCT KETONES,UA: NEGATIVE
SL AMB POCT NITRITE,UA: NEGATIVE
SL AMB POCT PH,UA: 5
SL AMB POCT SPECIFIC GRAVITY,UA: 1.01
SL AMB POCT URINE PROTEIN: NEGATIVE
SL AMB POCT UROBILINOGEN: 0.2

## 2019-12-18 PROCEDURE — 99213 OFFICE O/P EST LOW 20 MIN: CPT | Performed by: UROLOGY

## 2019-12-18 PROCEDURE — 81003 URINALYSIS AUTO W/O SCOPE: CPT | Performed by: UROLOGY

## 2019-12-18 PROCEDURE — 52000 CYSTOURETHROSCOPY: CPT | Performed by: UROLOGY

## 2019-12-18 RX ORDER — GABAPENTIN 100 MG/1
CAPSULE ORAL DAILY
COMMUNITY
Start: 2019-12-05

## 2019-12-18 NOTE — PATIENT INSTRUCTIONS
Have the PSA blood test done in 6 months  We will get you a phone report  If the number is acceptable, you will not need an exam at that time  If you do not hear from us within one week after having the blood test done, please call us and say, I am waiting for my PSA! 

## 2019-12-18 NOTE — LETTER
December 18, 2019     Jakob Green, [address] [address]    Patient: Peyman Amin  YOB: 1945   Date of Visit: 12/18/2019       Dear Dr Carmen Gaona: Thank you for referring Shaji Whitfield to me for evaluation  Below are my notes for this consultation  If you have questions, please do not hesitate to call me  I look forward to following your patient along with you  Sincerely,        Tesfaye Liu MD        CC: No Recipients  Tesfaye Liu MD  12/18/2019  9:32 AM  Sign at close encounter     HISTORY:    1  Follow-up for bladder cancer  Superficial tumor seven years ago  No hematuria urgency frequency burning    2  Follow-up for prostate cancer, XRT June 2018  Six cores of [place] six, ranging up to 60% tissue involved on a core  PSA 0 4         ASSESSMENT / PLAN:    Normal examination  At this point, we will defer further cysto examinations (very uncomfortable for patient)  Continue to get PSAs every six months for now:  Six months with phone report  One year with digital examination    The following portions of the patient's history were reviewed and updated as appropriate: allergies, current medications, past family history, past medical history, past social history, past surgical history and problem list     Review of Systems   All other systems reviewed and are negative  Objective:     Physical Exam   Constitutional: He appears well-developed and well-nourished  Genitourinary:   Genitourinary Comments: Penis testes normal    Prostate minimally enlarged no nodules       Cystoscopy  Date/Time: 12/18/2019 9:31 AM  Performed by: Tesfaye Liu MD  Authorized by: Tesfaye Liu MD     Procedure details: cystoscopy    Additional Procedure Details:      Patient presents for cystoscopy  I have discussed the reasons for doing the test, and the potential risks and complications    Patient expressed understanding, and signed informed consent document  The patient was carefully  positioned supine on the examining table  Sterile preparation was performed on the urethra  Xylocaine jelly was instilled and left  Indwelling for the procedure  The 13 French flexible cystoscope was passed with the following findings:      Urethra:  No strictures    Prostate:  Significant lateral lobe hyperplasia, 20 g resectable    Bladder: Moderate trabeculations, no lesion tumor stones     Residual urine:  100 mL    Patient tolerated the procedure well and was escorted from the examining table  0   Lab Value Date/Time    PSA 0 4 11/25/2019 [facility]  No results found for: BUN  No results found for: CREATININE  No components found for: CBC      Patient Active Problem List   Diagnosis    [facility]    [facility] prostate ([facility] )    History of prostate cancer        Diagnoses and all orders for this visit:    History of bladder cancer  -     POCT urine dip auto non-scope    History of prostate cancer  -     PSA Total, Diagnostic; Future    Other orders  -     gabapentin (NEURONTIN) 100 mg capsule; daily           Patient ID: Ronit Patel  is a 66 y o  male        Current Outpatient Medications:     acetaminophen (TYLENOL) 500 mg tablet, Take 500 mg by mouth every 6 (six) hours as needed, Disp: , Rfl:     Echinacea 125 MG CAPS, Take 1 capsule by mouth daily, Disp: , Rfl:     fluticasone (FLONASE) 50 mcg/act nasal spray, 2 sprays into each nostril daily, Disp: , Rfl:     lansoprazole (PREVACID) 30 mg capsule, Take 1 capsule by mouth daily, Disp: , Rfl:     Multiple Vitamin (MULTI-VITAMIN DAILY) TABS, Take 1 tablet by mouth daily, Disp: , Rfl:     naproxen (NAPROSYN) 500 mg tablet, Take 1 tablet by mouth daily As needed for pain , Disp: , Rfl:     VIAGRA 50 MG tablet, , Disp: , Rfl:     gabapentin (NEURONTIN) 100 mg capsule, daily, Disp: , Rfl:     Past Medical History:   Diagnosis Date    Acute cystitis 2014    Bladder cancer [facility] ) 2012    BPH with urinary obstruction 2015    GERD (gastroesophageal reflux disease)     Gross hematuria 2012    Malignant neoplasm of lateral wall of bladder [facility] [address] ) 2017    Malignant neoplasm of prostate ([facility] ) 2017    Microhematuria 2013    Pancreatitis     Urinary retention 2016       Past Surgical History:   Procedure Laterality Date    BACK SURGERY      BLADDER TUMOR EXCISION      CHOLECYSTECTOMY      COLONOSCOPY      CYSTOSCOPY  2017    INGUINAL HERNIA REPAIR      JOINT REPLACEMENT      Bilateral knee    KNEE SURGERY      ORCHIECTOMY Left     SKIN BIOPSY      Forehead tumor removal    SURGERY SCROTAL / TESTICULAR      Left testicle removed    TONSILLECTOMY      TRANSURETHRAL RESECTION OF BLADDER TUMOR  2012    US GUIDED PROSTATE BIOPSY  2017       Social History                   Jayla Sneed MD  12/18/2019  9:26 AM  Incomplete     HISTORY:      1  Finished XRT June 2018 for [place] six prostate cancer in 5 of the 12 cores, with involvement ranging from 10% to 60% of those cores  PSA 2 2 in August     2  History of bladder cancer, tumor over six years ago, cysto have been negative to date for any recurrent tumors  ASSESSMENT / PLAN:    ***    {Common ambulatory SmartLinks:25205}    Review of Systems      Objective:     Physical Exam      0   Lab Value Date/Time    PSA 0 4 11/25/2019 0845   ]  No results found for: BUN  No results found for: CREATININE  No components found for: CBC      Patient Active Problem List   Diagnosis    Prostate CA ([facility] )    Carcinoma of prostate ([facility] )    History of prostate cancer        {Assess/PlanSmartLinks:10619}       Patient ID: Robin Suggs  is a 66 y o  male        Current Outpatient Medications:     acetaminophen (TYLENOL) 500 mg tablet, Take 500 mg by mouth every 6 (six) hours as needed, Disp: , Rfl:     Echinacea 125 MG CAPS, Take 1 capsule by mouth daily, Disp: , Rfl:     fluticasone (FLONASE) 50 mcg/act nasal spray, 2 sprays into each nostril daily, Disp: , Rfl:     lansoprazole (PREVACID) 30 mg capsule, Take 1 capsule by mouth daily, Disp: , Rfl:     Multiple Vitamin (MULTI-VITAMIN DAILY) TABS, Take 1 tablet by mouth daily, Disp: , Rfl:     naproxen (NAPROSYN) 500 mg tablet, Take 1 tablet by mouth daily As needed for pain , Disp: , Rfl:     VIAGRA 50 MG tablet, , Disp: , Rfl:     gabapentin (NEURONTIN) 100 mg capsule, daily, Disp: , Rfl:     Past Medical History:   Diagnosis Date    Acute cystitis 2014    Bladder [facility] ) 2012    BPH with urinary obstruction 2015    GERD (gastroesophageal reflux disease)     Gross hematuria 2012    Malignant neoplasm of lateral wall of bladder ([facility] ) 2017    Malignant neoplasm of prostate ([facility] ) 2017    Microhematuria 2013    Pancreatitis     Urinary retention 2016       Past Surgical History:   Procedure Laterality Date    BACK SURGERY      BLADDER TUMOR EXCISION      CHOLECYSTECTOMY      COLONOSCOPY      CYSTOSCOPY  2017    INGUINAL HERNIA REPAIR      JOINT REPLACEMENT      Bilateral knee    KNEE SURGERY      ORCHIECTOMY Left     SKIN BIOPSY      Forehead tumor removal    SURGERY SCROTAL / TESTICULAR      Left testicle removed    TONSILLECTOMY      TRANSURETHRAL RESECTION OF BLADDER TUMOR  2012    [facility] BIOPSY  2017       Social History

## 2019-12-18 NOTE — PROGRESS NOTES
HISTORY:    1  Follow-up for bladder cancer  Superficial tumor seven years ago  No hematuria urgency frequency burning    2  Follow-up for prostate cancer, XRT June 2018  Six cores of [place] six, ranging up to 60% tissue involved on a core  PSA 0 4         ASSESSMENT / PLAN:    Normal examination  At this point, we will defer further cysto examinations (very uncomfortable for patient)  Continue to get PSAs every six months for now:  Six months with phone report  One year with digital examination    The following portions of the patient's history were reviewed and updated as appropriate: allergies, current medications, past family history, past medical history, past social history, past surgical history and problem list     Review of Systems   All other systems reviewed and are negative  Objective:     Physical Exam   Constitutional: He appears well-developed and well-nourished  Genitourinary:   Genitourinary Comments: Penis testes normal    Prostate minimally enlarged no nodules       Cystoscopy  Date/Time: 12/18/2019 9:31 AM  Performed by: Frederick Lacey MD  Authorized by: Frederick Lacey MD     Procedure details: cystoscopy    Additional Procedure Details:      Patient presents for cystoscopy  I have discussed the reasons for doing the test, and the potential risks and complications  Patient expressed understanding, and signed informed consent document  The patient was carefully  positioned supine on the examining table  Sterile preparation was performed on the urethra  Xylocaine jelly was instilled and left  Indwelling for the procedure  The 13 [place] flexible cystoscope was passed with the following findings:      Urethra:  No strictures    Prostate:  Significant lateral lobe hyperplasia, 20 g resectable    Bladder: Moderate trabeculations, no lesion tumor stones     Residual urine:  100 mL    Patient tolerated the procedure well and was escorted from the examining table        0 Lab Value Date/Time    PSA 0 4 11/25/2019 [facility]    ]  No results found for: BUN  No results found for: CREATININE  No components found for: CBC      Patient Active Problem List   Diagnosis    Prostate CA ([facility] )    Carcinoma of prostate ([facility] )    History of prostate cancer        Diagnoses and all orders for this visit:    History of bladder cancer  -     POCT urine dip auto non-scope    History of prostate cancer  -     PSA Total, Diagnostic; Future    Other orders  -     gabapentin (NEURONTIN) 100 mg capsule; daily           Patient ID: Demetrio Real  is a 66 y o  male        Current Outpatient Medications:     acetaminophen (TYLENOL) 500 mg tablet, Take 500 mg by mouth every 6 (six) hours as needed, Disp: , Rfl:     Echinacea 125 MG CAPS, Take 1 capsule by mouth daily, Disp: , Rfl:     fluticasone (FLONASE) 50 mcg/act nasal spray, 2 sprays into each nostril daily, Disp: , Rfl:     lansoprazole (PREVACID) 30 mg capsule, Take 1 capsule by mouth daily, Disp: , Rfl:     Multiple Vitamin (MULTI-VITAMIN DAILY) TABS, Take 1 tablet by mouth daily, Disp: , Rfl:     naproxen (NAPROSYN) 500 mg tablet, Take 1 tablet by mouth daily As needed for pain , Disp: , Rfl:     VIAGRA 50 MG tablet, , Disp: , Rfl:     gabapentin (NEURONTIN) 100 mg capsule, daily, Disp: , Rfl:     Past Medical History:   Diagnosis Date    Acute cystitis 2014    Bladder cancer ([facility] ) 2012    BPH with urinary obstruction 2015    GERD (gastroesophageal reflux disease)     Gross hematuria 2012    Malignant neoplasm of lateral wall of bladder ([facility] ) 2017    Malignant neoplasm of prostate ([facility] ) 2017    Microhematuria 2013    Pancreatitis     Urinary retention 2016       Past Surgical History:   Procedure Laterality Date    BACK SURGERY      BLADDER TUMOR EXCISION      CHOLECYSTECTOMY      COLONOSCOPY      CYSTOSCOPY  2017    INGUINAL HERNIA REPAIR      JOINT REPLACEMENT      Bilateral knee    KNEE SURGERY      ORCHIECTOMY Left     SKIN BIOPSY      Forehead tumor removal    SURGERY SCROTAL / TESTICULAR      Left testicle removed    TONSILLECTOMY      TRANSURETHRAL RESECTION OF BLADDER TUMOR  2012    [facility] BIOPSY  2017       Social History

## 2020-06-09 LAB — HBA1C MFR BLD HPLC: 5.9 %

## 2020-12-08 LAB — HBA1C MFR BLD HPLC: 5.8 %

## 2020-12-17 ENCOUNTER — TELEMEDICINE (OUTPATIENT)
Dept: UROLOGY | Facility: MEDICAL CENTER | Age: 75
End: 2020-12-17
Payer: MEDICARE

## 2020-12-17 DIAGNOSIS — Z85.51 HISTORY OF BLADDER CANCER: ICD-10-CM

## 2020-12-17 DIAGNOSIS — Z85.46 HISTORY OF PROSTATE CANCER: Primary | ICD-10-CM

## 2020-12-17 PROCEDURE — 99442 PR PHYS/QHP TELEPHONE EVALUATION 11-20 MIN: CPT | Performed by: UROLOGY

## 2021-02-12 DIAGNOSIS — Z23 ENCOUNTER FOR IMMUNIZATION: ICD-10-CM
